# Patient Record
Sex: FEMALE | Race: WHITE | HISPANIC OR LATINO | URBAN - NONMETROPOLITAN AREA
[De-identification: names, ages, dates, MRNs, and addresses within clinical notes are randomized per-mention and may not be internally consistent; named-entity substitution may affect disease eponyms.]

---

## 2019-07-17 ENCOUNTER — APPOINTMENT (OUTPATIENT)
Dept: URBAN - NONMETROPOLITAN AREA CLINIC 39 | Age: 54
Setting detail: DERMATOLOGY
End: 2019-07-17

## 2019-07-17 DIAGNOSIS — L21.8 OTHER SEBORRHEIC DERMATITIS: ICD-10-CM

## 2019-07-17 PROBLEM — M12.9 ARTHROPATHY, UNSPECIFIED: Status: ACTIVE | Noted: 2019-07-17

## 2019-07-17 PROBLEM — H91.90 UNSPECIFIED HEARING LOSS, UNSPECIFIED EAR: Status: ACTIVE | Noted: 2019-07-17

## 2019-07-17 PROCEDURE — 99202 OFFICE O/P NEW SF 15 MIN: CPT

## 2019-07-17 PROCEDURE — OTHER MIPS QUALITY: OTHER

## 2019-07-17 PROCEDURE — OTHER COUNSELING: OTHER

## 2019-07-17 PROCEDURE — OTHER REASSURANCE: OTHER

## 2019-07-17 PROCEDURE — OTHER PRESCRIPTION: OTHER

## 2019-07-17 RX ORDER — HYDROCORTISONE 25 MG/G
CREAM TOPICAL
Qty: 3 | Refills: 1 | Status: ERX | COMMUNITY
Start: 2019-07-17

## 2019-07-17 RX ORDER — KETOCONAZOLE 20 MG/G
CREAM TOPICAL ONCE DAILY
Qty: 3 | Refills: 1 | Status: ERX | COMMUNITY
Start: 2019-07-17

## 2019-07-17 ASSESSMENT — LOCATION SIMPLE DESCRIPTION DERM: LOCATION SIMPLE: INFERIOR FOREHEAD

## 2019-07-17 ASSESSMENT — LOCATION ZONE DERM: LOCATION ZONE: FACE

## 2019-07-17 ASSESSMENT — LOCATION DETAILED DESCRIPTION DERM: LOCATION DETAILED: INFERIOR MID FOREHEAD

## 2019-07-23 ENCOUNTER — APPOINTMENT (OUTPATIENT)
Dept: URBAN - NONMETROPOLITAN AREA CLINIC 39 | Age: 54
Setting detail: DERMATOLOGY
End: 2019-07-23

## 2019-07-23 DIAGNOSIS — L21.8 OTHER SEBORRHEIC DERMATITIS: ICD-10-CM

## 2019-07-23 DIAGNOSIS — L71.8 OTHER ROSACEA: ICD-10-CM

## 2019-07-23 PROCEDURE — OTHER MIPS QUALITY: OTHER

## 2019-07-23 PROCEDURE — 99213 OFFICE O/P EST LOW 20 MIN: CPT | Mod: 25

## 2019-07-23 PROCEDURE — OTHER PRESCRIPTION: OTHER

## 2019-07-23 PROCEDURE — OTHER COUNSELING: OTHER

## 2019-07-23 PROCEDURE — OTHER REASSURANCE: OTHER

## 2019-07-23 PROCEDURE — OTHER ADDITIONAL NOTES: OTHER

## 2019-07-23 PROCEDURE — OTHER INTRAMUSCULAR KENALOG: OTHER

## 2019-07-23 PROCEDURE — 96372 THER/PROPH/DIAG INJ SC/IM: CPT

## 2019-07-23 RX ORDER — HYDROCORTISONE 25 MG/G
OINTMENT TOPICAL
Qty: 1 | Refills: 1 | Status: ERX | COMMUNITY
Start: 2019-07-23

## 2019-07-23 ASSESSMENT — LOCATION DETAILED DESCRIPTION DERM
LOCATION DETAILED: RIGHT INFERIOR MEDIAL MALAR CHEEK
LOCATION DETAILED: RIGHT BUTTOCK

## 2019-07-23 ASSESSMENT — LOCATION SIMPLE DESCRIPTION DERM
LOCATION SIMPLE: RIGHT BUTTOCK
LOCATION SIMPLE: RIGHT CHEEK

## 2019-07-23 ASSESSMENT — LOCATION ZONE DERM
LOCATION ZONE: FACE
LOCATION ZONE: TRUNK

## 2019-07-23 NOTE — PROCEDURE: ADDITIONAL NOTES
Additional Notes: Pt to try otc antihistamine, Allegra, Zyrtec or Claritin
Additional Notes: Pt referral placed to Dr Mendenhall for patch testing
Detail Level: Simple

## 2019-07-23 NOTE — PROCEDURE: INTRAMUSCULAR KENALOG
Add Option For Additional Mediation: No
Concentration (Mg/Ml): 40.0
Administered By (Optional): LATOYA
Kenalog Preparation: kenalog
Consent: The risks of atrophy were reviewed with the patient.
Total Volume (Ccs): 1.5
Detail Level: Zone
Concentration (Mg/Ml) Of Additional Medication: 2.5

## 2021-05-18 NOTE — HPI: RASH
How Severe Is Your Rash?: moderate
Is This A New Presentation, Or A Follow-Up?: Rash
Additional History: Pt has been prescribed prednisone and mometasone furoate and states it helped but came back after finishing the prednisone
Statement Selected